# Patient Record
Sex: FEMALE | Race: WHITE | Employment: STUDENT | URBAN - NONMETROPOLITAN AREA
[De-identification: names, ages, dates, MRNs, and addresses within clinical notes are randomized per-mention and may not be internally consistent; named-entity substitution may affect disease eponyms.]

---
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
## 2023-01-27 ENCOUNTER — TELEPHONE (OUTPATIENT)
Dept: FAMILY MEDICINE CLINIC | Age: 18
End: 2023-01-27

## 2023-01-27 ENCOUNTER — TELEMEDICINE (OUTPATIENT)
Dept: FAMILY MEDICINE CLINIC | Age: 18
End: 2023-01-27
Payer: COMMERCIAL

## 2023-01-27 DIAGNOSIS — F41.1 GAD (GENERALIZED ANXIETY DISORDER): ICD-10-CM

## 2023-01-27 DIAGNOSIS — F51.04 PSYCHOPHYSIOLOGICAL INSOMNIA: Primary | ICD-10-CM

## 2023-01-27 PROCEDURE — 99214 OFFICE O/P EST MOD 30 MIN: CPT | Performed by: NURSE PRACTITIONER

## 2023-01-27 RX ORDER — PAROXETINE HYDROCHLORIDE 20 MG/1
20 TABLET, FILM COATED ORAL DAILY
Qty: 30 TABLET | Refills: 3 | Status: SHIPPED | OUTPATIENT
Start: 2023-01-27

## 2023-01-27 RX ORDER — HYDROXYZINE PAMOATE 25 MG/1
25 CAPSULE ORAL 3 TIMES DAILY PRN
Qty: 60 CAPSULE | Refills: 2 | Status: SHIPPED | OUTPATIENT
Start: 2023-01-27 | End: 2023-02-26

## 2023-01-27 ASSESSMENT — ENCOUNTER SYMPTOMS: RESPIRATORY NEGATIVE: 1

## 2023-01-27 NOTE — PROGRESS NOTES
2023    TELEHEALTH EVALUATION -- Audio/Visual (During MRUCA-32 public health emergency)    HPI:    Viral Ruggiero (:  2005) has requested an audio/video evaluation for the following concern(s): Anxiety     HPI:    Inciting events or triggers for anxiety - daily life events, has had anxiety for a few years never treated recently started on paxil 10 mg does not think it ha helped much. Is back at school, not noticing a difference and also states it decreases her appetite   Frequency of anxiety - everyday  Panic attacks? No  Symptoms of panic attacks -   n/a  Sleep Disturbances? Yes - trouble falling an staying asleep has tried melatonin but not working. Impaired concentration? No  Substance abuse? No  Suicidal/Homicidal Ideation? No    Compliant with meds: yes  Med side effects: Yes - will change to taking at night before bed     Sees therapist?: she did try to reach out to school counselor but did not schedule an appt. Advised to schedule appt with school counseling services. Family History of Mental Illness? unknown     Review of Systems   Constitutional:  Negative for chills, fatigue and fever. HENT: Negative. Respiratory: Negative. Skin: Negative. Psychiatric/Behavioral:  Positive for sleep disturbance. Negative for agitation, behavioral problems, confusion, decreased concentration, dysphoric mood, self-injury and suicidal ideas. The patient is nervous/anxious. Also felt a little down the other day very vague about what., states things build up sometimes and she does not know how to handle it     Prior to Visit Medications    Medication Sig Taking?  Authorizing Provider   PARoxetine (PAXIL) 20 MG tablet Take 1 tablet by mouth daily Yes Allison Art, APRN - CNP   hydrOXYzine pamoate (VISTARIL) 25 MG capsule Take 1 capsule by mouth 3 times daily as needed for Itching Yes Allison Art, APRN - CNP   montelukast (SINGULAIR) 10 MG tablet   Historical Provider, MD Levocetirizine Dihydrochloride (XYZAL PO) Take by mouth  Historical Provider, MD   fluticasone (FLONASE) 50 MCG/ACT nasal spray 1 spray by Each Nostril route daily  Patient not taking: Reported on 12/28/2022  Corazon Bread, APRN - CNP   loratadine (CLARITIN) 10 MG tablet Take 1 tablet by mouth daily  Patient not taking: Reported on 12/28/2022  Corazon Bread, APRN - CNP   ibuprofen (ADVIL;MOTRIN) 400 MG tablet Take 1 tablet by mouth every 8 hours as needed for Pain  Patient not taking: Reported on 12/28/2022  MICHELLE Copeland       Social History     Tobacco Use    Smoking status: Never     Passive exposure: Never    Smokeless tobacco: Never   Vaping Use    Vaping Use: Never used   Substance Use Topics    Alcohol use: No    Drug use: No        No Known Allergies, No past medical history on file., No past surgical history on file.,   Social History     Tobacco Use    Smoking status: Never     Passive exposure: Never    Smokeless tobacco: Never   Vaping Use    Vaping Use: Never used   Substance Use Topics    Alcohol use: No    Drug use: No   , No family history on file.,   Immunization History   Administered Date(s) Administered    COVID-19, PFIZER PURPLE top, DILUTE for use, (age 15 y+), 30mcg/0.3mL 09/06/2021, 10/02/2021    DTaP vaccine 2003, 2003, 02/27/2004, 02/21/2005    DTaP/IPV (Kristi Mom, Kinrix) 09/05/2008    HPV 9-valent Marylen Harden) 12/27/2019, 08/11/2021    HPV Quadrivalent (Gardasil) 06/17/2020    Hepatitis A Ped/Adol (Havrix, Vaqta) 09/05/2008, 12/22/2009    Hepatitis B Ped/Adol (Engerix-B, Recombivax HB) 2003, 02/27/2004, 08/11/2015    Hib (HbOC) 2003, 2003, 02/27/2004    Hib vaccine 02/21/2005    Influenza Virus Vaccine 11/07/2008, 12/22/2009, 08/23/2010, 10/22/2010, 01/20/2014    Influenza, FLUARIX, FLULAVAL, FLUZONE (age 10 mo+) AND AFLURIA, (age 1 y+), PF, 0.5mL 11/07/2018, 12/27/2019    MMR 11/02/2004, 09/05/2008    Meningococcal B, OMV (Bexsero) 08/11/2021, 03/02/2022    Meningococcal MCV4O (Menveo) 12/27/2019    Meningococcal MCV4P (Menactra) 08/02/2016    Polio IPV (IPOL) 2003, 2003    Polio Virus Vaccine 02/21/2005    Tdap (Boostrix, Adacel) 08/11/2015    Varicella (Varivax) 12/14/2004, 09/05/2008   ,   Health Maintenance   Topic Date Due    HIV screen  Never done    Chlamydia/GC screen  Never done    Hepatitis C screen  Never done    COVID-19 Vaccine (3 - Booster for Pfizer series) 11/27/2021    Flu vaccine (1) 08/01/2022    Depression Screen  12/28/2023    DTaP/Tdap/Td vaccine (7 - Td or Tdap) 08/11/2025    Hepatitis A vaccine  Completed    Hib vaccine  Completed    HPV vaccine  Completed    Varicella vaccine  Completed    Meningococcal (ACWY) vaccine  Completed    Pneumococcal 0-64 years Vaccine  Aged Out       PHYSICAL EXAMINATION:  [ INSTRUCTIONS:  \"[x]\" Indicates a positive item  \"[]\" Indicates a negative item  -- DELETE ALL ITEMS NOT EXAMINED]  Vital Signs: (As obtained by patient/caregiver or practitioner observation)    Blood pressure-  Heart rate-    Respiratory rate-    Temperature-  Pulse oximetry-     Constitutional: [x] Appears well-developed and well-nourished [x] No apparent distress      [] Abnormal-   Mental status  [x] Alert and awake  [x] Oriented to person/place/time [x]Able to follow commands      Eyes:  EOM    []  Normal  [] Abnormal-  Sclera  []  Normal  [] Abnormal -         Discharge []  None visible  [] Abnormal -    HENT:   [] Normocephalic, atraumatic.   [] Abnormal   [] Mouth/Throat: Mucous membranes are moist.     External Ears [] Normal  [] Abnormal-     Neck: [] No visualized mass     Pulmonary/Chest: [x] Respiratory effort normal.  [x] No visualized signs of difficulty breathing or respiratory distress        [] Abnormal-      Musculoskeletal:   [] Normal gait with no signs of ataxia         [] Normal range of motion of neck        [] Abnormal-       Neurological:        [] No Facial Asymmetry (Cranial nerve 7 motor function) (limited exam to video visit)          [] No gaze palsy        [] Abnormal-         Skin:        [x] No significant exanthematous lesions or discoloration noted on facial skin         [] Abnormal-            Psychiatric:       [] Normal Affect [] No Hallucinations        [] Abnormal-     Other pertinent observable physical exam findings-     ASSESSMENT/PLAN:  1. ANGIE (generalized anxiety disorder)  Not controlled  Increase paxil to 20 mg daily   - PARoxetine (PAXIL) 20 MG tablet; Take 1 tablet by mouth daily  Dispense: 30 tablet; Refill: 3    2. Psychophysiological insomnia  Uncontrolled add vistaril for insomnia may also supplement during the day if needed for anxiety  Contact school counselor for therapy   - hydrOXYzine pamoate (VISTARIL) 25 MG capsule; Take 1 capsule by mouth 3 times daily as needed for Itching  Dispense: 60 capsule; Refill: 2  Pt in agreement with plan   Return in about 3 weeks (around 2/17/2023) for VV for anxiety. Loreta Mcbride, was evaluated through a synchronous (real-time) audio-video encounter. The patient (or guardian if applicable) is aware that this is a billable service, which includes applicable co-pays. This Virtual Visit was conducted with patient's (and/or legal guardian's) consent. The visit was conducted pursuant to the emergency declaration under the [address] [place] [address], [address] [place] [address] authority and the [employer] and [employer] General Act. Patient identification was verified, and a caregiver was present when appropriate. The patient was located at [address]): [address] [place] [address].  [facility], [place]. Provider was located at [place] [address] ([address] [place] [address]): [address] [place] [address].  [facility], [place]. Total time spent on this encounter: Not billed by time    --MISTY Moreno - CNP on 1/27/2023 at 10:20 AM    An electronic signature was used to authenticate this note.

## 2023-01-30 NOTE — TELEPHONE ENCOUNTER
Future Appointments   Date Time Provider Jessica Mary   2/17/2023 10:00 AM [facility]

## 2023-02-17 ENCOUNTER — TELEPHONE (OUTPATIENT)
Dept: FAMILY MEDICINE CLINIC | Age: 18
End: 2023-02-17

## 2023-02-17 ENCOUNTER — TELEMEDICINE (OUTPATIENT)
Dept: FAMILY MEDICINE CLINIC | Age: 18
End: 2023-02-17
Payer: COMMERCIAL

## 2023-02-17 DIAGNOSIS — F51.04 PSYCHOPHYSIOLOGICAL INSOMNIA: ICD-10-CM

## 2023-02-17 DIAGNOSIS — F41.1 GAD (GENERALIZED ANXIETY DISORDER): Primary | ICD-10-CM

## 2023-02-17 PROCEDURE — 99213 OFFICE O/P EST LOW 20 MIN: CPT | Performed by: NURSE PRACTITIONER

## 2023-02-17 RX ORDER — TRAZODONE HYDROCHLORIDE 50 MG/1
50 TABLET ORAL NIGHTLY PRN
Qty: 30 TABLET | Refills: 5 | Status: SHIPPED | OUTPATIENT
Start: 2023-02-17

## 2023-02-17 SDOH — ECONOMIC STABILITY: HOUSING INSECURITY
IN THE LAST 12 MONTHS, WAS THERE A TIME WHEN YOU DID NOT HAVE A STEADY PLACE TO SLEEP OR SLEPT IN A SHELTER (INCLUDING NOW)?: PATIENT REFUSED

## 2023-02-17 SDOH — ECONOMIC STABILITY: TRANSPORTATION INSECURITY
IN THE PAST 12 MONTHS, HAS LACK OF TRANSPORTATION KEPT YOU FROM MEETINGS, WORK, OR FROM GETTING THINGS NEEDED FOR DAILY LIVING?: PATIENT DECLINED

## 2023-02-17 SDOH — ECONOMIC STABILITY: FOOD INSECURITY: WITHIN THE PAST 12 MONTHS, YOU WORRIED THAT YOUR FOOD WOULD RUN OUT BEFORE YOU GOT MONEY TO BUY MORE.: PATIENT DECLINED

## 2023-02-17 SDOH — ECONOMIC STABILITY: INCOME INSECURITY: HOW HARD IS IT FOR YOU TO PAY FOR THE VERY BASICS LIKE FOOD, HOUSING, MEDICAL CARE, AND HEATING?: PATIENT DECLINED

## 2023-02-17 SDOH — ECONOMIC STABILITY: FOOD INSECURITY: WITHIN THE PAST 12 MONTHS, THE FOOD YOU BOUGHT JUST DIDN'T LAST AND YOU DIDN'T HAVE MONEY TO GET MORE.: PATIENT DECLINED

## 2023-02-17 ASSESSMENT — ENCOUNTER SYMPTOMS: RESPIRATORY NEGATIVE: 1

## 2023-02-17 NOTE — PROGRESS NOTES
2023    TELEHEALTH EVALUATION -- Audio/Visual (During [facility])    HPI:    Arianna Wilkins (:  2005) has requested an audio/video evaluation for the following concern(s): Anxiety     HPI:    Inciting events or triggers for anxiety - daily life events, going out in public   Frequency of anxiety - it had been daily but better with the paxil, stats she is worrying less able to go more places without feeling anxious, is feeling happier. Panic attacks? No  Symptoms of panic attacks -   n/a  Sleep Disturbances? Has tried melatonin and vistaril 25 mg nightly but can not fall or stay asleep sleeping 4-5 hours a night, her mind races and can;t shut it down. Impaired concentration? No  Substance abuse? No  Suicidal/Homicidal Ideation? No    Compliant with meds: yes  Med side effects: No    Sees therapist?: No  Family History of Mental Illness? No     Review of Systems   Constitutional:  Negative for fatigue and fever. Respiratory: Negative. Skin: Negative. Neurological: Negative. Psychiatric/Behavioral:  Positive for sleep disturbance. Negative for agitation, behavioral problems, decreased concentration, dysphoric mood, self-injury and suicidal ideas. The patient is nervous/anxious. Prior to Visit Medications    Medication Sig Taking?  Authorizing Provider   traZODone (DESYREL) 50 MG tablet Take 1 tablet by mouth nightly as needed for Sleep Yes Jj Valdez APRN - CNP   PARoxetine (PAXIL) 20 MG tablet Take 1 tablet by mouth daily  Nirmal Jennings APRN - CNP   hydrOXYzine pamoate (VISTARIL) 25 MG capsule Take 1 capsule by mouth 3 times daily as needed for Itching  Jj Valdez APRN - CNP   montelukast (SINGULAIR) 10 MG tablet   Historical Provider, MD   Levocetirizine Dihydrochloride (XYZAL PO) Take by mouth  Historical Provider, MD   fluticasone (FLONASE) 50 MCG/ACT nasal spray 1 spray by Each Nostril route daily  Patient not taking: Reported on 2022  Marlene Wadsworth MISTY Mosher - CNP   loratadine (CLARITIN) 10 MG tablet Take 1 tablet by mouth daily  Patient not taking: Reported on 12/28/2022  MISTY Calderon CNP   ibuprofen (ADVIL;MOTRIN) 400 MG tablet Take 1 tablet by mouth every 8 hours as needed for Pain  Patient not taking: Reported on 12/28/2022  MICHELLE Mock       Social History     Tobacco Use    Smoking status: Never     Passive exposure: Never    Smokeless tobacco: Never   Vaping Use    Vaping Use: Never used   Substance Use Topics    Alcohol use: No    Drug use: No        No Known Allergies, History reviewed. No pertinent past medical history. , History reviewed. No pertinent surgical history. ,   Social History     Tobacco Use    Smoking status: Never     Passive exposure: Never    Smokeless tobacco: Never   Vaping Use    Vaping Use: Never used   Substance Use Topics    Alcohol use: No    Drug use: No   , History reviewed. No pertinent family history. ,   Immunization History   Administered Date(s) Administered    COVID-19, PFIZER PURPLE top, DILUTE for use, (age 15 y+), 30mcg/0.3mL 09/06/2021, 10/02/2021    DTaP vaccine 2003, 2003, 02/27/2004, 02/21/2005    DTaP/IPV (Loly Mike, Kinrix) 09/05/2008    HPV 9-valent Yasmany Santosh) 12/27/2019, 08/11/2021    HPV Quadrivalent (Gardasil) 06/17/2020    Hepatitis A Ped/Adol (Havrix, Vaqta) 09/05/2008, 12/22/2009    Hepatitis B Ped/Adol (Engerix-B, Recombivax HB) 2003, 02/27/2004, 08/11/2015    Hib (HbOC) 2003, 2003, 02/27/2004    Hib vaccine 02/21/2005    Influenza Virus Vaccine 11/07/2008, 12/22/2009, 08/23/2010, 10/22/2010, 01/20/2014    Influenza, FLUARIX, FLULAVAL, FLUZONE (age 10 mo+) AND AFLURIA, (age 1 y+), PF, 0.5mL 11/07/2018, 12/27/2019    MMR 11/02/2004, 09/05/2008    Meningococcal B, OMV (Bexsero) 08/11/2021, 03/02/2022    Meningococcal MCV4O (Menveo) 12/27/2019    Meningococcal MCV4P (Menactra) 08/02/2016    Polio IPV (IPOL) 2003, 2003    Polio Virus Vaccine 02/21/2005    Tdap (Boostrix, Adacel) 08/11/2015    Varicella (Varivax) 12/14/2004, 09/05/2008   ,   Health Maintenance   Topic Date Due    HIV screen  Never done    Chlamydia/GC screen  Never done    Hepatitis C screen  Never done    COVID-19 Vaccine (3 - Booster for Pfizer series) 11/27/2021    Flu vaccine (1) 08/01/2022    Depression Screen  12/28/2023    DTaP/Tdap/Td vaccine (7 - Td or Tdap) 08/11/2025    Hepatitis A vaccine  Completed    Hib vaccine  Completed    HPV vaccine  Completed    Varicella vaccine  Completed    Meningococcal (ACWY) vaccine  Completed    Pneumococcal 0-64 years Vaccine  Aged Out       PHYSICAL EXAMINATION:  [ INSTRUCTIONS:  \"[x]\" Indicates a positive item  \"[]\" Indicates a negative item  -- DELETE ALL ITEMS NOT EXAMINED]  Vital Signs: (As obtained by patient/caregiver or practitioner observation)    Blood pressure-  Heart rate-    Respiratory rate-    Temperature-  Pulse oximetry-     Constitutional: [x] Appears well-developed and well-nourished [x] No apparent distress      [] Abnormal-   Mental status  [x] Alert and awake  [x] Oriented to person/place/time [x]Able to follow commands      Eyes:  EOM    []  Normal  [] Abnormal-  Sclera  []  Normal  [] Abnormal -         Discharge []  None visible  [] Abnormal -    HENT:   [x] Normocephalic, atraumatic.   [] Abnormal   [] Mouth/Throat: Mucous membranes are moist.     External Ears [] Normal  [] Abnormal-     Neck: [] No visualized mass     Pulmonary/Chest: [x] Respiratory effort normal.  [x] No visualized signs of difficulty breathing or respiratory distress        [] Abnormal-      Musculoskeletal:   [] Normal gait with no signs of ataxia         [] Normal range of motion of neck        [] Abnormal-       Neurological:        [] No Facial Asymmetry (Cranial nerve 7 motor function) (limited exam to video visit)          [] No gaze palsy        [] Abnormal-         Skin:        [x] No significant exanthematous lesions or discoloration noted on facial skin         [] Abnormal-            Psychiatric:       [x] Normal Affect [] No Hallucinations        [] Abnormal-     Other pertinent observable physical exam findings-     ASSESSMENT/PLAN:  1. ANGIE (generalized anxiety disorder)  Improved  Continue with 20 mg daily  Pt in agreement with plan    2. Psychophysiological insomnia  Start trazodone  Advise counseling through school    Return in about 3 months (around 5/17/2023) for f/u anxiety. Tarah Patel, was evaluated through a synchronous (real-time) audio-video encounter. The patient (or guardian if applicable) is aware that this is a billable service, which includes applicable co-pays. This Virtual Visit was conducted with patient's (and/or legal guardian's) consent. The visit was conducted pursuant to the emergency declaration under the [address] [place] [address], [address] [place] [address] [facility] authority and the [email or web address] and [employer] General Act. Patient identification was verified, and a caregiver was present when appropriate. The patient was located at Home: [address] [place] [id number]  Provider was located at [facility] ([address] [place] [address] Dept): [facility].  [facility], [place]        Total time spent on this encounter: Not billed by time    --MISTY Limon CNP on 2/17/2023 at 10:13 AM    An electronic signature was used to authenticate this note.

## 2023-03-29 ENCOUNTER — TELEMEDICINE (OUTPATIENT)
Dept: FAMILY MEDICINE CLINIC | Age: 18
End: 2023-03-29
Payer: COMMERCIAL

## 2023-03-29 DIAGNOSIS — J30.89 NON-SEASONAL ALLERGIC RHINITIS DUE TO OTHER ALLERGIC TRIGGER: ICD-10-CM

## 2023-03-29 DIAGNOSIS — F41.1 GAD (GENERALIZED ANXIETY DISORDER): Primary | ICD-10-CM

## 2023-03-29 DIAGNOSIS — F32.9 REACTIVE DEPRESSION: ICD-10-CM

## 2023-03-29 DIAGNOSIS — R45.86 MOOD SWING: ICD-10-CM

## 2023-03-29 DIAGNOSIS — R45.4 IRRITABILITY: ICD-10-CM

## 2023-03-29 DIAGNOSIS — F41.0 PANIC ATTACK: ICD-10-CM

## 2023-03-29 PROCEDURE — 99214 OFFICE O/P EST MOD 30 MIN: CPT | Performed by: NURSE PRACTITIONER

## 2023-03-29 RX ORDER — PAROXETINE HYDROCHLORIDE 20 MG/1
20 TABLET, FILM COATED ORAL DAILY
Qty: 30 TABLET | Refills: 3 | Status: SHIPPED | OUTPATIENT
Start: 2023-03-29

## 2023-03-29 RX ORDER — QUETIAPINE FUMARATE 50 MG/1
50 TABLET, EXTENDED RELEASE ORAL NIGHTLY
Qty: 30 TABLET | Refills: 4 | Status: SHIPPED | OUTPATIENT
Start: 2023-03-29

## 2023-03-29 RX ORDER — HYDROXYZINE HYDROCHLORIDE 25 MG/1
25 TABLET, FILM COATED ORAL EVERY 8 HOURS PRN
Qty: 30 TABLET | Refills: 0 | Status: SHIPPED | OUTPATIENT
Start: 2023-03-29 | End: 2023-04-08

## 2023-03-29 ASSESSMENT — ENCOUNTER SYMPTOMS: RESPIRATORY NEGATIVE: 1

## 2023-03-29 NOTE — PROGRESS NOTES
tablet; Refill: 3    2. Mood swing  -#1  - QUEtiapine (SEROQUEL XR) 50 MG extended release tablet; Take 1 tablet by mouth nightly  Dispense: 30 tablet; Refill: 4    3. Irritability  -#1  - QUEtiapine (SEROQUEL XR) 50 MG extended release tablet; Take 1 tablet by mouth nightly  Dispense: 30 tablet; Refill: 4    4. Panic attack  -#1  -will also order atarax for short term anxiety  - QUEtiapine (SEROQUEL XR) 50 MG extended release tablet; Take 1 tablet by mouth nightly  Dispense: 30 tablet; Refill: 4    5. Reactive depression  -uncontrolled, continue with paxil 20 mg daily and add seroquel 50 mg hs  - QUEtiapine (SEROQUEL XR) 50 MG extended release tablet; Take 1 tablet by mouth nightly  Dispense: 30 tablet; Refill: 4    6. Non-seasonal allergic rhinitis due to other allergic trigger  -improve  Continue singulair 10 mg and xyzal 5 mg daily  Pt in agreement with plan      Return if symptoms worsen or fail to improve. Pt has jaguar t scheduled for May 2023. Abdulaziz Hepmhill, was evaluated through a synchronous (real-time) audio-video encounter. The patient (or guardian if applicable) is aware that this is a billable service, which includes applicable co-pays. This Virtual Visit was conducted with patient's (and/or legal guardian's) consent. The visit was conducted pursuant to the emergency declaration under the [address] [place] [address] and the Arnaldo docplanner and [employer] General Act. Patient identification was verified, and a caregiver was present when appropriate. The patient was located at Home: [id number] [place] [address] [address] [address]  Provider was located at [facility] [address].  Galindo CHANG AM, II.VIERTEL        Total time spent on this encounter: Not billed by time    --MISTY Dave CNP on 3/29/2023 at 1:26 PM    An electronic signature was used to authenticate this note.

## 2023-05-30 RX ORDER — HYDROXYZINE HYDROCHLORIDE 25 MG/1
25 TABLET, FILM COATED ORAL EVERY 8 HOURS PRN
Qty: 30 TABLET | Refills: 0 | Status: SHIPPED | OUTPATIENT
Start: 2023-05-30 | End: 2023-06-09

## 2023-06-26 RX ORDER — HYDROXYZINE HYDROCHLORIDE 25 MG/1
25 TABLET, FILM COATED ORAL EVERY 8 HOURS PRN
Qty: 30 TABLET | Refills: 0 | Status: SHIPPED | OUTPATIENT
Start: 2023-06-26 | End: 2023-07-06

## 2023-06-30 ENCOUNTER — OFFICE VISIT (OUTPATIENT)
Dept: FAMILY MEDICINE CLINIC | Age: 18
End: 2023-06-30
Payer: COMMERCIAL

## 2023-06-30 VITALS
TEMPERATURE: 97.8 F | WEIGHT: 130.4 LBS | RESPIRATION RATE: 16 BRPM | DIASTOLIC BLOOD PRESSURE: 80 MMHG | HEIGHT: 67 IN | BODY MASS INDEX: 20.47 KG/M2 | SYSTOLIC BLOOD PRESSURE: 114 MMHG | OXYGEN SATURATION: 99 % | HEART RATE: 86 BPM

## 2023-06-30 DIAGNOSIS — H92.02 ACUTE OTALGIA, LEFT: Primary | ICD-10-CM

## 2023-06-30 PROCEDURE — 99214 OFFICE O/P EST MOD 30 MIN: CPT | Performed by: NURSE PRACTITIONER

## 2023-06-30 RX ORDER — AMOXICILLIN AND CLAVULANATE POTASSIUM 875; 125 MG/1; MG/1
1 TABLET, FILM COATED ORAL 2 TIMES DAILY
Qty: 14 TABLET | Refills: 0 | Status: SHIPPED | OUTPATIENT
Start: 2023-06-30 | End: 2023-07-07

## 2023-06-30 RX ORDER — PREDNISONE 20 MG/1
20 TABLET ORAL DAILY
Qty: 5 TABLET | Refills: 0 | Status: SHIPPED | OUTPATIENT
Start: 2023-06-30 | End: 2023-07-05

## 2023-06-30 ASSESSMENT — PATIENT HEALTH QUESTIONNAIRE - PHQ9
SUM OF ALL RESPONSES TO PHQ QUESTIONS 1-9: 0
2. FEELING DOWN, DEPRESSED OR HOPELESS: 0
7. TROUBLE CONCENTRATING ON THINGS, SUCH AS READING THE NEWSPAPER OR WATCHING TELEVISION: 0
6. FEELING BAD ABOUT YOURSELF - OR THAT YOU ARE A FAILURE OR HAVE LET YOURSELF OR YOUR FAMILY DOWN: 0
SUM OF ALL RESPONSES TO PHQ QUESTIONS 1-9: 0
3. TROUBLE FALLING OR STAYING ASLEEP: 0
SUM OF ALL RESPONSES TO PHQ9 QUESTIONS 1 & 2: 0
4. FEELING TIRED OR HAVING LITTLE ENERGY: 0
8. MOVING OR SPEAKING SO SLOWLY THAT OTHER PEOPLE COULD HAVE NOTICED. OR THE OPPOSITE, BEING SO FIGETY OR RESTLESS THAT YOU HAVE BEEN MOVING AROUND A LOT MORE THAN USUAL: 0
SUM OF ALL RESPONSES TO PHQ QUESTIONS 1-9: 0
10. IF YOU CHECKED OFF ANY PROBLEMS, HOW DIFFICULT HAVE THESE PROBLEMS MADE IT FOR YOU TO DO YOUR WORK, TAKE CARE OF THINGS AT HOME, OR GET ALONG WITH OTHER PEOPLE: 0
1. LITTLE INTEREST OR PLEASURE IN DOING THINGS: 0
SUM OF ALL RESPONSES TO PHQ QUESTIONS 1-9: 0
5. POOR APPETITE OR OVEREATING: 0
9. THOUGHTS THAT YOU WOULD BE BETTER OFF DEAD, OR OF HURTING YOURSELF: 0

## 2023-07-31 RX ORDER — HYDROXYZINE HYDROCHLORIDE 25 MG/1
25 TABLET, FILM COATED ORAL EVERY 8 HOURS PRN
Qty: 30 TABLET | Refills: 0 | Status: SHIPPED | OUTPATIENT
Start: 2023-07-31 | End: 2023-08-10

## 2023-07-31 NOTE — TELEPHONE ENCOUNTER
Recent Visits  Date Type Provider Dept   06/30/23 Office Visit MISTY Webster - CNP [facility]   12/28/22 Office Visit MISTY Adams - CNP [facility]   Showing recent visits within past 540 days with a meds authorizing provider and meeting all other requirements  Future Appointments  No visits were found meeting these conditions.   Showing future appointments within next 150 days with a meds authorizing provider and meeting all other requirements

## 2023-09-05 RX ORDER — HYDROXYZINE HYDROCHLORIDE 25 MG/1
25 TABLET, FILM COATED ORAL EVERY 8 HOURS PRN
Qty: 30 TABLET | Refills: 0 | Status: SHIPPED | OUTPATIENT
Start: 2023-09-05 | End: 2023-09-15

## 2023-09-05 NOTE — TELEPHONE ENCOUNTER
Recent Visits  Date Type Provider Dept   06/30/23 Office Visit MISTY Salomon - CNP [facility]   12/28/22 Office Visit MISTY Chinchilla - CNP [facility]   Showing recent visits within past 540 days with a meds authorizing provider and meeting all other requirements  Future Appointments  No visits were found meeting these conditions.   Showing future appointments within next 150 days with a meds authorizing provider and meeting all other requirements

## 2023-10-09 ENCOUNTER — OFFICE VISIT (OUTPATIENT)
Dept: FAMILY MEDICINE CLINIC | Age: 18
End: 2023-10-09
Payer: COMMERCIAL

## 2023-10-09 VITALS
RESPIRATION RATE: 16 BRPM | DIASTOLIC BLOOD PRESSURE: 76 MMHG | WEIGHT: 159.8 LBS | TEMPERATURE: 98.5 F | HEIGHT: 67 IN | HEART RATE: 81 BPM | BODY MASS INDEX: 25.08 KG/M2 | SYSTOLIC BLOOD PRESSURE: 116 MMHG | OXYGEN SATURATION: 99 %

## 2023-10-09 DIAGNOSIS — F41.1 GAD (GENERALIZED ANXIETY DISORDER): ICD-10-CM

## 2023-10-09 DIAGNOSIS — L70.0 CYSTIC ACNE: Primary | ICD-10-CM

## 2023-10-09 DIAGNOSIS — F33.0 MAJOR DEPRESSIVE DISORDER, RECURRENT, MILD (HCC): ICD-10-CM

## 2023-10-09 PROCEDURE — 99213 OFFICE O/P EST LOW 20 MIN: CPT | Performed by: NURSE PRACTITIONER

## 2023-10-09 RX ORDER — NORGESTIMATE AND ETHINYL ESTRADIOL 0.25-0.035
KIT ORAL
COMMUNITY
Start: 2023-10-06

## 2023-10-09 RX ORDER — CLINDAMYCIN AND BENZOYL PEROXIDE 10; 50 MG/G; MG/G
GEL TOPICAL
Qty: 50 G | Refills: 3 | Status: SHIPPED | OUTPATIENT
Start: 2023-10-09

## 2023-10-09 RX ORDER — PAROXETINE HYDROCHLORIDE 20 MG/1
20 TABLET, FILM COATED ORAL DAILY
Qty: 90 TABLET | Refills: 3 | Status: SHIPPED | OUTPATIENT
Start: 2023-10-09

## 2023-10-09 RX ORDER — TRETINOIN 0.5 MG/G
CREAM TOPICAL
Qty: 45 G | Refills: 3 | Status: SHIPPED | OUTPATIENT
Start: 2023-10-09 | End: 2023-11-08

## 2023-10-09 RX ORDER — ADAPALENE 0.1 G/100G
CREAM TOPICAL
Qty: 45 G | Refills: 4 | Status: SHIPPED | OUTPATIENT
Start: 2023-10-09 | End: 2023-11-08

## 2023-10-16 RX ORDER — HYDROXYZINE HYDROCHLORIDE 25 MG/1
25 TABLET, FILM COATED ORAL EVERY 8 HOURS PRN
Qty: 30 TABLET | Refills: 0 | Status: SHIPPED | OUTPATIENT
Start: 2023-10-16 | End: 2023-10-26

## 2023-10-16 NOTE — TELEPHONE ENCOUNTER
Recent Visits  Date Type Provider Dept   10/09/23 Office Visit Moustapha Valero, MISTY Song CNP [facility]   06/30/23 Office Visit Yennifer Adhikari, MISTY Song CNP [facility]   12/28/22 Office Visit Nirmal Pantoja, APRN - CNP [facility]   Showing recent visits within past 540 days with a meds authorizing provider and meeting all other requirements  Future Appointments  Date Type Provider Dept   01/04/24 Appointment Nirmal Pantoja, APRN - CNP [facility]   Showing future appointments within next 150 days with a meds authorizing provider and meeting all other requirements

## 2023-11-01 RX ORDER — HYDROXYZINE HYDROCHLORIDE 25 MG/1
25 TABLET, FILM COATED ORAL EVERY 8 HOURS PRN
Qty: 30 TABLET | Refills: 0 | Status: SHIPPED | OUTPATIENT
Start: 2023-11-01 | End: 2023-11-11

## 2023-11-01 NOTE — TELEPHONE ENCOUNTER
Recent Visits  Date Type Provider Dept   10/09/23 Office Visit Ute Bautista, MISTY - CNP [facility]   06/30/23 Office Visit Jeni Winters, MISTY - CNP [facility]   12/28/22 Office Visit Nirmal See APRN - CNP [facility]   Showing recent visits within past 540 days with a meds authorizing provider and meeting all other requirements  Future Appointments  Date Type Provider Dept   01/04/24 Appointment Nirmal See, MISTY - CNP [facility]   Showing future appointments within next 150 days with a meds authorizing provider and meeting all other requirements

## 2024-03-06 ENCOUNTER — TELEPHONE (OUTPATIENT)
Dept: FAMILY MEDICINE CLINIC | Age: 19
End: 2024-03-06

## 2024-03-06 DIAGNOSIS — F41.9 ANXIETY: Primary | ICD-10-CM

## 2024-03-06 NOTE — TELEPHONE ENCOUNTER
----- Message from Rachna Orozco sent at 3/6/2024  1:31 PM EST -----  Subject: Message to Provider    QUESTIONS  Information for Provider? Spoke with patient, she states that she is   needing lab orders placed in her chart. Her previous orders have already    and she is needing those to be updated. Please return her call to   assist, thank you.  ---------------------------------------------------------------------------  --------------  CALL BACK INFO  [phone]; OK to leave message on voicemail  ---------------------------------------------------------------------------  --------------  SCRIPT ANSWERS  Relationship to Patient? Self

## 2024-03-07 ENCOUNTER — NURSE ONLY (OUTPATIENT)
Dept: LAB | Age: 19
End: 2024-03-07

## 2024-03-07 ENCOUNTER — TELEPHONE (OUTPATIENT)
Dept: FAMILY MEDICINE CLINIC | Age: 19
End: 2024-03-07

## 2024-03-07 DIAGNOSIS — F41.9 ANXIETY: ICD-10-CM

## 2024-03-07 LAB
BASOPHILS NFR BLD AUTO: 0.7 %
BUN SERPL-MCNC: 11 MG/DL (ref 7–22)
CALCIUM SERPL-MCNC: 9.6 MG/DL (ref 8.5–10.5)
CHLORIDE SERPL-SCNC: 104 MEQ/L (ref 98–111)
CO2 SERPL-SCNC: 22 MEQ/L (ref 23–33)
CREAT SERPL-MCNC: 0.8 MG/DL (ref 0.4–1.2)
DEPRECATED RDW RBC AUTO: 44.5 FL (ref 35–45)
EOSINOPHIL NFR BLD AUTO: 2.5 %
EOSINOPHILS ABSOLUTE: 0.1 THOU/MM3 (ref 0–0.4)
ERYTHROCYTE [DISTWIDTH] IN BLOOD BY AUTOMATED COUNT: 13.1 % (ref 11.5–14.5)
GFR SERPL CREATININE-BSD FRML MDRD: > 60 ML/MIN/1.73M2
GLUCOSE SERPL-MCNC: 93 MG/DL (ref 70–108)
HCT VFR BLD AUTO: 42 % (ref 37–47)
HGB BLD-MCNC: 13.1 GM/DL (ref 12–16)
IMM GRANULOCYTES # BLD AUTO: 0.02 THOU/MM3 (ref 0–0.07)
IMM GRANULOCYTES NFR BLD AUTO: 0.4 %
LYMPHOCYTES ABSOLUTE: 2.1 THOU/MM3 (ref 1–4.8)
LYMPHOCYTES NFR BLD AUTO: 37.9 %
MCH RBC QN AUTO: 28.9 PG (ref 26–33)
MCHC RBC AUTO-ENTMCNC: 31.2 GM/DL (ref 32.2–35.5)
MONOCYTES ABSOLUTE: 0.6 THOU/MM3 (ref 0.4–1.3)
MONOCYTES NFR BLD AUTO: 10.5 %
NEUTROPHILS NFR BLD AUTO: 48 %
NRBC BLD AUTO-RTO: 0 /100 WBC
PLATELET # BLD AUTO: 302 THOU/MM3 (ref 130–400)
PMV BLD AUTO: 10.1 FL (ref 9.4–12.4)
POTASSIUM SERPL-SCNC: 4.4 MEQ/L (ref 3.5–5.2)
RBC # BLD AUTO: 4.53 MILL/MM3 (ref 4.2–5.4)
SEGMENTED NEUTROPHILS ABSOLUTE COUNT: 2.6 THOU/MM3 (ref 1.8–7.7)
SODIUM SERPL-SCNC: 141 MEQ/L (ref 135–145)
WBC # BLD AUTO: 5.5 THOU/MM3 (ref 4.8–10.8)

## 2024-03-07 NOTE — TELEPHONE ENCOUNTER
----- Message from MISTY Vail - CNP sent at 3/7/2024 12:24 PM EST -----  Let pt know labs look good, no anemia, TSH normal and glucose and renal function is normal.

## 2024-03-07 NOTE — TELEPHONE ENCOUNTER
Left message on answering machine requesting pt to call back at earliest convenience.   No HIPAA on file

## 2024-03-08 NOTE — TELEPHONE ENCOUNTER
Nirmal Jennings, APRN - CNP Nurse Practitioner Signed 3/6/2024     Copy     New lab orders palced, please remind her to fast before having done           Left message on answering machine requesting pt to call back at earliest convenience.   2 messages in this encounter

## 2024-05-13 RX ORDER — HYDROXYZINE HYDROCHLORIDE 25 MG/1
25 TABLET, FILM COATED ORAL EVERY 8 HOURS PRN
Qty: 30 TABLET | Refills: 0 | Status: SHIPPED | OUTPATIENT
Start: 2024-05-13 | End: 2024-05-23

## 2024-05-13 NOTE — TELEPHONE ENCOUNTER
Recent Visits  Date Type Provider Dept   10/09/23 Office Visit Nirmal Jennings APRN - CNP [facility]   06/30/23 Office Visit Radha Piña APRN - CNP [facility]   12/28/22 Office Visit Nirmal Jennings APRN - CNP [facility]   Showing recent visits within past 540 days with a meds authorizing provider and meeting all other requirements  Future Appointments  No visits were found meeting these conditions.  Showing future appointments within next 150 days with a meds authorizing provider and meeting all other requirements

## 2024-10-20 DIAGNOSIS — F41.1 GAD (GENERALIZED ANXIETY DISORDER): ICD-10-CM

## 2024-10-21 ENCOUNTER — TELEPHONE (OUTPATIENT)
Dept: FAMILY MEDICINE CLINIC | Age: 19
End: 2024-10-21

## 2024-10-21 RX ORDER — PAROXETINE 20 MG/1
20 TABLET, FILM COATED ORAL DAILY
Qty: 90 TABLET | Refills: 0 | Status: SHIPPED | OUTPATIENT
Start: 2024-10-21

## 2024-10-21 NOTE — TELEPHONE ENCOUNTER
Recent Visits  Date Type Provider Dept   10/09/23 Office Visit Nirmal Jennings APRN - CNP [facility]   06/30/23 Office Visit Radha Piña APRN - CNP [facility]   Showing recent visits within past 540 days with a meds authorizing provider and meeting all other requirements  Future Appointments  No visits were found meeting these conditions.  Showing future appointments within next 150 days with a meds authorizing provider and meeting all other requirements

## 2024-10-21 NOTE — TELEPHONE ENCOUNTER
Pt asking for med refills had not been seen since 2023, I did give her  90 day supply but pt is due for a visit for a Physical to make sure meds are working well for her, so  more refills can be given,

## 2024-10-22 NOTE — TELEPHONE ENCOUNTER
Left message on answering machine. Requested pt to call back at [phone], at their earliest convenience.

## 2024-10-23 NOTE — TELEPHONE ENCOUNTER
Left message on answering machine. Requested pt to call back at [phone], at their earliest convenience.     [employer] message sent with link to schedule appointment.

## 2024-10-30 ENCOUNTER — OFFICE VISIT (OUTPATIENT)
Dept: FAMILY MEDICINE CLINIC | Age: 19
End: 2024-10-30
Payer: COMMERCIAL

## 2024-10-30 VITALS
OXYGEN SATURATION: 98 % | TEMPERATURE: 98.3 F | BODY MASS INDEX: 26.81 KG/M2 | WEIGHT: 170.8 LBS | HEIGHT: 67 IN | SYSTOLIC BLOOD PRESSURE: 116 MMHG | HEART RATE: 100 BPM | RESPIRATION RATE: 16 BRPM | DIASTOLIC BLOOD PRESSURE: 78 MMHG

## 2024-10-30 DIAGNOSIS — F33.0 MAJOR DEPRESSIVE DISORDER, RECURRENT, MILD (HCC): ICD-10-CM

## 2024-10-30 DIAGNOSIS — Z13.31 POSITIVE SCREENING FOR DEPRESSION ON 9-ITEM PATIENT HEALTH QUESTIONNAIRE (PHQ-9): ICD-10-CM

## 2024-10-30 DIAGNOSIS — Z00.00 PHYSICAL EXAM: Primary | ICD-10-CM

## 2024-10-30 PROCEDURE — 99395 PREV VISIT EST AGE 18-39: CPT | Performed by: NURSE PRACTITIONER

## 2024-10-30 RX ORDER — FLUOXETINE 10 MG/1
10 CAPSULE ORAL DAILY
Qty: 30 CAPSULE | Refills: 3 | Status: SHIPPED | OUTPATIENT
Start: 2024-10-30

## 2024-10-30 SDOH — ECONOMIC STABILITY: FOOD INSECURITY: WITHIN THE PAST 12 MONTHS, THE FOOD YOU BOUGHT JUST DIDN'T LAST AND YOU DIDN'T HAVE MONEY TO GET MORE.: NEVER TRUE

## 2024-10-30 SDOH — ECONOMIC STABILITY: FOOD INSECURITY: WITHIN THE PAST 12 MONTHS, YOU WORRIED THAT YOUR FOOD WOULD RUN OUT BEFORE YOU GOT MONEY TO BUY MORE.: NEVER TRUE

## 2024-10-30 SDOH — ECONOMIC STABILITY: TRANSPORTATION INSECURITY
IN THE PAST 12 MONTHS, HAS LACK OF TRANSPORTATION KEPT YOU FROM MEETINGS, WORK, OR FROM GETTING THINGS NEEDED FOR DAILY LIVING?: YES

## 2024-10-30 SDOH — ECONOMIC STABILITY: INCOME INSECURITY: HOW HARD IS IT FOR YOU TO PAY FOR THE VERY BASICS LIKE FOOD, HOUSING, MEDICAL CARE, AND HEATING?: SOMEWHAT HARD

## 2024-10-30 ASSESSMENT — PATIENT HEALTH QUESTIONNAIRE - PHQ9
SUM OF ALL RESPONSES TO PHQ QUESTIONS 1-9: 12
5. POOR APPETITE OR OVEREATING: SEVERAL DAYS
2. FEELING DOWN, DEPRESSED OR HOPELESS: SEVERAL DAYS
8. MOVING OR SPEAKING SO SLOWLY THAT OTHER PEOPLE COULD HAVE NOTICED. OR THE OPPOSITE, BEING SO FIGETY OR RESTLESS THAT YOU HAVE BEEN MOVING AROUND A LOT MORE THAN USUAL: SEVERAL DAYS
1. LITTLE INTEREST OR PLEASURE IN DOING THINGS: SEVERAL DAYS
9. THOUGHTS THAT YOU WOULD BE BETTER OFF DEAD, OR OF HURTING YOURSELF: SEVERAL DAYS
SUM OF ALL RESPONSES TO PHQ QUESTIONS 1-9: 13
4. FEELING TIRED OR HAVING LITTLE ENERGY: MORE THAN HALF THE DAYS
1. LITTLE INTEREST OR PLEASURE IN DOING THINGS: SEVERAL DAYS
9. THOUGHTS THAT YOU WOULD BE BETTER OFF DEAD, OR OF HURTING YOURSELF: SEVERAL DAYS
7. TROUBLE CONCENTRATING ON THINGS, SUCH AS READING THE NEWSPAPER OR WATCHING TELEVISION: MORE THAN HALF THE DAYS
SUM OF ALL RESPONSES TO PHQ QUESTIONS 1-9: 13
SUM OF ALL RESPONSES TO PHQ QUESTIONS 1-9: 13
10. IF YOU CHECKED OFF ANY PROBLEMS, HOW DIFFICULT HAVE THESE PROBLEMS MADE IT FOR YOU TO DO YOUR WORK, TAKE CARE OF THINGS AT HOME, OR GET ALONG WITH OTHER PEOPLE: SOMEWHAT DIFFICULT
6. FEELING BAD ABOUT YOURSELF - OR THAT YOU ARE A FAILURE OR HAVE LET YOURSELF OR YOUR FAMILY DOWN: MORE THAN HALF THE DAYS
4. FEELING TIRED OR HAVING LITTLE ENERGY: MORE THAN HALF THE DAYS
3. TROUBLE FALLING OR STAYING ASLEEP: MORE THAN HALF THE DAYS
7. TROUBLE CONCENTRATING ON THINGS, SUCH AS READING THE NEWSPAPER OR WATCHING TELEVISION: MORE THAN HALF THE DAYS
10. IF YOU CHECKED OFF ANY PROBLEMS, HOW DIFFICULT HAVE THESE PROBLEMS MADE IT FOR YOU TO DO YOUR WORK, TAKE CARE OF THINGS AT HOME, OR GET ALONG WITH OTHER PEOPLE: SOMEWHAT DIFFICULT
8. MOVING OR SPEAKING SO SLOWLY THAT OTHER PEOPLE COULD HAVE NOTICED. OR THE OPPOSITE - BEING SO FIDGETY OR RESTLESS THAT YOU HAVE BEEN MOVING AROUND A LOT MORE THAN USUAL: SEVERAL DAYS
5. POOR APPETITE OR OVEREATING: SEVERAL DAYS
2. FEELING DOWN, DEPRESSED OR HOPELESS: SEVERAL DAYS
SUM OF ALL RESPONSES TO PHQ9 QUESTIONS 1 & 2: 2
6. FEELING BAD ABOUT YOURSELF - OR THAT YOU ARE A FAILURE OR HAVE LET YOURSELF OR YOUR FAMILY DOWN: MORE THAN HALF THE DAYS
3. TROUBLE FALLING OR STAYING ASLEEP: MORE THAN HALF THE DAYS
SUM OF ALL RESPONSES TO PHQ QUESTIONS 1-9: 13

## 2024-10-30 ASSESSMENT — ENCOUNTER SYMPTOMS
RESPIRATORY NEGATIVE: 1
GASTROINTESTINAL NEGATIVE: 1

## 2024-10-30 ASSESSMENT — COLUMBIA-SUICIDE SEVERITY RATING SCALE - C-SSRS
1. IN THE PAST MONTH, HAVE YOU WISHED YOU WERE DEAD OR WISHED YOU COULD GO TO SLEEP AND NOT WAKE UP?: YES
6. IN YOUR LIFETIME, HAVE YOU EVER DONE ANYTHING, STARTED TO DO ANYTHING, OR PREPARED TO DO ANYTHING TO END YOUR LIFE?: NO
2. IN THE PAST MONTH, HAVE YOU ACTUALLY HAD ANY THOUGHTS OF KILLING YOURSELF?: NO

## 2024-10-30 NOTE — PATIENT INSTRUCTIONS
years old $0.75, under 6 free.   Phone Number: 848.829.5745  Website: https://www.[employer] transit:  What they offer: Transportation throughout the state of [place] and [place]. Available for Ambulatory and wheelchair bound services.  Cost: [place] Medicaid and Private pay   Phone Number: 619.609.5090  Website: https://[employer]    Veterans Service Commission:  What they offer: Transportation for [facility] Veterans only.   Phone Number: 920.143.7956  Website: https://www.[employer]                  [place] Housing Resources*  (Call United Way/211 if need more resources.)        [facility]:  What they offer: Helps provide affordable, decent, safe housing.  Help further your education in buying a home and securing your financial future with Family Self-Sufficiency (FSS).  They have a home ownership program that helps Housing Choice voucher holders.  They have the Single Room Occupancy (SRO) which helps single homeless males apply to live in the [facility].  Shelter Plus care vouchers for individuals and small households in [facility], who have at least 1 disable person and are homeless. Project based vouchers for subsidized units by the public housing authority’s section 8 program.   Phone Number: 797.305.8903  Website: [facility]:  What they offer: Help for Domestic violence victims. Obtain emergency confidential shelter, individual and housing advocacy.    Phone Number: 219.276.5555  Website: https://[employer]    [facility]:  What they offer: Provide prevention services before families reach crisis, Homeless Shelter for families, stabilizing programs once they have secured housing to insure, they remain independent.   Phone Number:  803.592.6101  Website: https://familypromise.org/latest/affiliate/[facility]          [facility]:  What they

## 2024-10-30 NOTE — PROGRESS NOTES
[facility]  [address]  [place] [address]  Dept: [phone]  Dept Fax: [phone]  Loc: [phone]    Kelly Serrano is a 19 y.o. femalewho presents today for her medical conditions/complaints as noted below.  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[place], STUDYING BUSINESS ADMINSTRATION [employer]     No formal exercise program  Sleeping well at hs   Not sexually active     Anxiety/MDD  -pt experiencing anxiety, more than before  -picks at her nail beds,  Seeing a therapist  -having anxiety attacks , once a week  -feeling down and sad a few times a week  -able to get out of bed in the AM, looks forward to doing things she likes  Does spend a lot of time in bed  Restless at night  , hard to shut mind down at night   Taking paxil 20 mg but not working well       Lab Results   Component Value Date    WBC 5.5 03/07/2024    HGB 13.1 03/07/2024    HCT 42.0 03/07/2024    MCV 92.7 03/07/2024     03/07/2024      Lab Results   Component Value Date/Time     03/07/2024 06:15 AM    K 4.4 03/07/2024 06:15 AM     03/07/2024 06:15 AM    CO2 22 03/07/2024 06:15 AM    BUN 11 03/07/2024 06:15 AM    CREATININE 0.8 03/07/2024 06:15 AM    GLUCOSE 93 03/07/2024 06:15 AM    CALCIUM 9.6 03/07/2024 06:15 AM    LABGLOM >60 03/07/2024 06:15 AM       Lab Results   Component Value Date    TSH 1.200 03/07/2024      Current Outpatient Medications   Medication Sig Dispense Refill    FLUoxetine (PROZAC) 10 MG capsule Take 1 capsule by mouth daily 30 capsule 3     No current facility-administered medications for this visit.       History reviewed. No pertinent past medical history.   History reviewed. No pertinent surgical history.  History reviewed. No pertinent family history.  Social History     Tobacco Use    Smoking status: Never     Passive

## 2024-12-02 ENCOUNTER — OFFICE VISIT (OUTPATIENT)
Dept: FAMILY MEDICINE CLINIC | Age: 19
End: 2024-12-02
Payer: COMMERCIAL

## 2024-12-02 VITALS
TEMPERATURE: 98.2 F | OXYGEN SATURATION: 98 % | HEART RATE: 94 BPM | WEIGHT: 173.6 LBS | SYSTOLIC BLOOD PRESSURE: 118 MMHG | BODY MASS INDEX: 27.25 KG/M2 | RESPIRATION RATE: 16 BRPM | DIASTOLIC BLOOD PRESSURE: 76 MMHG | HEIGHT: 67 IN

## 2024-12-02 DIAGNOSIS — F41.1 GAD (GENERALIZED ANXIETY DISORDER): ICD-10-CM

## 2024-12-02 DIAGNOSIS — F33.0 MAJOR DEPRESSIVE DISORDER, RECURRENT, MILD (HCC): Primary | ICD-10-CM

## 2024-12-02 PROCEDURE — 99213 OFFICE O/P EST LOW 20 MIN: CPT | Performed by: NURSE PRACTITIONER

## 2024-12-02 NOTE — PROGRESS NOTES
[facility]  [address]  [place] [address]  Dept: [phone]  Dept Fax: [phone]  Loc: [phone]    Kelly Serrano is a 19 y.o. femalewho presents today for her medical conditions/complaints as noted below.  Kelly Serranois c/o of Anxiety (Follow up no concerns )      :     HPI      Ot here for 1 month f/u     Anxiety/MDD  -pt had been experiencing anxiety, more than before  -picks at her nail beds,  Seeing a therapist  -had been having anxiety attacks , once a week, less now  -feeling down and sad a few times a week  -able to get out of bed in the AM, looks forward to doing things she likes  -spending less time in bed   Had been Restless at night  , hard to shut mind down at night, but sleeping better now    Had been Taking paxil 20 mg but not working well   -changed over to prozac 1 month ago and it is working better for her  -anxiety is lessened , feeling more calm   Wants a dose increase    Did state she has a headache daily that will last for an hour or so goes away o it's own  No N/V or vision changes or facial N/T   Has not had eyes checked in the last year   Able to talk to customers easier       Lab Results   Component Value Date    WBC 5.5 03/07/2024    HGB 13.1 03/07/2024    HCT 42.0 03/07/2024    MCV 92.7 03/07/2024     03/07/2024      Lab Results   Component Value Date/Time     03/07/2024 06:15 AM    K 4.4 03/07/2024 06:15 AM     03/07/2024 06:15 AM    CO2 22 03/07/2024 06:15 AM    BUN 11 03/07/2024 06:15 AM    CREATININE 0.8 03/07/2024 06:15 AM    GLUCOSE 93 03/07/2024 06:15 AM    CALCIUM 9.6 03/07/2024 06:15 AM    LABGLOM >60 03/07/2024 06:15 AM       Lab Results   Component Value Date    TSH 1.200 03/07/2024        Current Outpatient Medications   Medication Sig Dispense Refill    FLUoxetine (PROZAC) 20 MG capsule Take 1 capsule by mouth daily 90 capsule 1     No current facility-administered medications

## 2025-01-03 ENCOUNTER — OFFICE VISIT (OUTPATIENT)
Dept: FAMILY MEDICINE CLINIC | Age: 20
End: 2025-01-03

## 2025-01-03 VITALS
DIASTOLIC BLOOD PRESSURE: 72 MMHG | HEIGHT: 67 IN | OXYGEN SATURATION: 97 % | WEIGHT: 175.8 LBS | BODY MASS INDEX: 27.59 KG/M2 | SYSTOLIC BLOOD PRESSURE: 118 MMHG | RESPIRATION RATE: 16 BRPM | HEART RATE: 63 BPM | TEMPERATURE: 98.3 F

## 2025-01-03 DIAGNOSIS — H61.22 IMPACTED CERUMEN OF LEFT EAR: ICD-10-CM

## 2025-01-03 DIAGNOSIS — J35.8 TONSILLAR EXUDATE: ICD-10-CM

## 2025-01-03 DIAGNOSIS — J02.9 PHARYNGITIS, UNSPECIFIED ETIOLOGY: Primary | ICD-10-CM

## 2025-01-03 LAB
HETEROPHILE ANTIBODIES: NEGATIVE
STREPTOCOCCUS A RNA: NEGATIVE

## 2025-01-03 RX ORDER — AMOXICILLIN 500 MG/1
500 CAPSULE ORAL 3 TIMES DAILY
Qty: 30 CAPSULE | Refills: 0 | Status: SHIPPED | OUTPATIENT
Start: 2025-01-03 | End: 2025-01-13

## 2025-01-03 ASSESSMENT — PATIENT HEALTH QUESTIONNAIRE - PHQ9
6. FEELING BAD ABOUT YOURSELF - OR THAT YOU ARE A FAILURE OR HAVE LET YOURSELF OR YOUR FAMILY DOWN: NOT AT ALL
SUM OF ALL RESPONSES TO PHQ QUESTIONS 1-9: 1
4. FEELING TIRED OR HAVING LITTLE ENERGY: SEVERAL DAYS
SUM OF ALL RESPONSES TO PHQ QUESTIONS 1-9: 1
10. IF YOU CHECKED OFF ANY PROBLEMS, HOW DIFFICULT HAVE THESE PROBLEMS MADE IT FOR YOU TO DO YOUR WORK, TAKE CARE OF THINGS AT HOME, OR GET ALONG WITH OTHER PEOPLE: NOT DIFFICULT AT ALL
8. MOVING OR SPEAKING SO SLOWLY THAT OTHER PEOPLE COULD HAVE NOTICED. OR THE OPPOSITE, BEING SO FIGETY OR RESTLESS THAT YOU HAVE BEEN MOVING AROUND A LOT MORE THAN USUAL: NOT AT ALL
3. TROUBLE FALLING OR STAYING ASLEEP: NOT AT ALL
SUM OF ALL RESPONSES TO PHQ9 QUESTIONS 1 & 2: 0
2. FEELING DOWN, DEPRESSED OR HOPELESS: NOT AT ALL
SUM OF ALL RESPONSES TO PHQ QUESTIONS 1-9: 1
5. POOR APPETITE OR OVEREATING: NOT AT ALL
9. THOUGHTS THAT YOU WOULD BE BETTER OFF DEAD, OR OF HURTING YOURSELF: NOT AT ALL
1. LITTLE INTEREST OR PLEASURE IN DOING THINGS: NOT AT ALL
7. TROUBLE CONCENTRATING ON THINGS, SUCH AS READING THE NEWSPAPER OR WATCHING TELEVISION: NOT AT ALL
SUM OF ALL RESPONSES TO PHQ QUESTIONS 1-9: 1

## 2025-01-03 NOTE — PROGRESS NOTES
SUBJECTIVE:  Kelly Serrano is a 19 y.o. y/o female that presents with Follow-up (Sore throat, red, white spots started 2 days ago )  .    HPI:      Symptoms have been present for 3 day(s).  Fever?  No  Odynophagia? Yes  Dysphagia?  Yes  Cough?  Yes  Rhinitis?  No  Hx of EBV? No  Sick Contacts? Yes    Pt denies SOB, wheezing, stridor, nausea or vomiting.      OBJECTIVE:  /72   Pulse 63   Temp 98.3 °F (36.8 °C)   Resp 16   Ht 1.702 m (5' 7\")   Wt 79.7 kg (175 lb 12.8 oz)   SpO2 97%   BMI 27.53 kg/m²   Physical Examination:   General appearance - alert, well appearing, and in no distress  Ears - bilateral TM's and external ear canals normal  Nose - normal and patent, no erythema, discharge or polyps  Mouth - erythematous, exudate noted, tonsils hypertrophied with exudate, tongue normal, and mono obtained   Cerumen impaction cleared after irrigation   Neck - bilateral symmetric anterior adenopathy  Chest - clear to auscultation, no wheezes, rales or rhonchi, symmetric air entry  Heart - normal rate, regular rhythm, normal S1, S2, no murmurs, rubs, clicks or gallops  Abdomen - soft, nontender, nondistended, no masses or organomegaly  Musculoskeletal - no joint tenderness, deformity or swelling  Skin exam - normal coloration and turgor, no rashes, no suspicious skin lesions noted.      Rapid Strep Performed today was .    ASSESSMENT & PLAN  Kelly was seen today for follow-up.    Diagnoses and all orders for this visit:    Pharyngitis, unspecified etiology  -     POCT Rapid Strep A DNA (Alere i) negative   -     POCT Infectious mononucleosis Abs (mono) negative  capsule by mouth 3 times daily for 10 days  -     Culture, Throat  Send out for culture  Mono negative will use amoxicillin for tx.   Start amoxicillin  Warm soft and cool foods  Call for changing symptoms  Exam not consistent with peritonsillar abscess  Motrin for fever or pain   Pt in agreement wit plan   Impacted cerumen of left ear  -

## 2025-01-05 LAB — BACTERIA THROAT AEROBE CULT: NORMAL

## 2025-01-06 ENCOUNTER — TELEPHONE (OUTPATIENT)
Dept: FAMILY MEDICINE CLINIC | Age: 20
End: 2025-01-06

## 2025-01-06 NOTE — TELEPHONE ENCOUNTER
----- Message from MISTY Vail CNP sent at 1/5/2025  9:45 PM EST -----  Let pt know her throat culture is negative, have pt symptoms improved?? With the antibiotics

## 2025-01-07 NOTE — TELEPHONE ENCOUNTER
Left message on answering machine. Requested pt to call back at [phone], at their earliest convenience.

## 2025-01-07 NOTE — TELEPHONE ENCOUNTER
Pt informed of throat culture results and voiced understanding.   Pt states that she no longer has any symptoms

## 2025-01-18 ENCOUNTER — HOSPITAL ENCOUNTER (EMERGENCY)
Age: 20
Discharge: HOME | End: 2025-01-18
Payer: COMMERCIAL

## 2025-01-18 VITALS
SYSTOLIC BLOOD PRESSURE: 119 MMHG | RESPIRATION RATE: 16 BRPM | DIASTOLIC BLOOD PRESSURE: 86 MMHG | OXYGEN SATURATION: 98 % | HEART RATE: 93 BPM | TEMPERATURE: 99 F | BODY MASS INDEX: 27.75 KG/M2 | WEIGHT: 177.2 LBS

## 2025-01-18 DIAGNOSIS — J02.9 ACUTE PHARYNGITIS, UNSPECIFIED ETIOLOGY: Primary | ICD-10-CM

## 2025-01-18 LAB
MONONUCLEOSIS ANTIBODY: NEGATIVE
S PYO AG THROAT QL: NEGATIVE

## 2025-01-18 PROCEDURE — 87651 STREP A DNA AMP PROBE: CPT

## 2025-01-18 PROCEDURE — 36415 COLL VENOUS BLD VENIPUNCTURE: CPT

## 2025-01-18 PROCEDURE — 99214 OFFICE O/P EST MOD 30 MIN: CPT

## 2025-01-18 PROCEDURE — 86308 HETEROPHILE ANTIBODY SCREEN: CPT

## 2025-01-18 PROCEDURE — 99213 OFFICE O/P EST LOW 20 MIN: CPT

## 2025-01-18 PROCEDURE — 87070 CULTURE OTHR SPECIMN AEROBIC: CPT

## 2025-01-18 RX ORDER — PREDNISONE 10 MG/1
TABLET ORAL
Qty: 20 TABLET | Refills: 0 | Status: SHIPPED | OUTPATIENT
Start: 2025-01-18 | End: 2025-01-28

## 2025-01-18 RX ORDER — CEFDINIR 300 MG/1
300 CAPSULE ORAL 2 TIMES DAILY
Qty: 20 CAPSULE | Refills: 0 | Status: SHIPPED | OUTPATIENT
Start: 2025-01-18 | End: 2025-01-28

## 2025-01-18 ASSESSMENT — PAIN DESCRIPTION - LOCATION: LOCATION: THROAT

## 2025-01-18 ASSESSMENT — PAIN - FUNCTIONAL ASSESSMENT
PAIN_FUNCTIONAL_ASSESSMENT: ACTIVITIES ARE NOT PREVENTED
PAIN_FUNCTIONAL_ASSESSMENT: 0-10

## 2025-01-18 ASSESSMENT — PAIN DESCRIPTION - FREQUENCY: FREQUENCY: CONTINUOUS

## 2025-01-18 ASSESSMENT — PAIN SCALES - GENERAL: PAINLEVEL_OUTOF10: 8

## 2025-01-18 NOTE — ED TRIAGE NOTES
Kelly arrives to room with complaint of  sore throat, swelling in tonsil area.  Just finished antibiotic Monday from PCP for same symptoms, felt better.  Symptoms returning the past 2 days.             States strep and mono testing came back negative.       Taking ibuprofen, last dose today at 8 am

## 2025-01-18 NOTE — DISCHARGE INSTRUCTIONS
Your Monospot test was negative.  This is unlikely to be mononucleosis.  Most sore throats are viral but considering you experience partial improvement with amoxicillin and unfortunately symptoms recurred antibiotics are reasonable tach.  Please take cefdinir as prescribed till gone even if you are feeling better.  You can take steroids in addition, this will help decrease inflammation associated with swollen/enlarged inflamed tonsils.    If you are having trouble swallowing, drooling, unable to tolerate fluids please go to ER.  If you are experiencing shortness of breath or audible breathing please go to ER.  If anything is out-of-control please go to ER.    If you fail to improve please follow-up with family doctor or return to urgent care.    I hope you are feeling better soon!

## 2025-01-18 NOTE — ED PROVIDER NOTES
[facility] URGENT CARE      URGENT CARE     Pt Name: Kelly Serrano  MRN: [id number]  Birthdate 2005  Date of evaluation: 1/18/2025  Provider: MISTY Mays CNP    Urgent Care Encounter     CHIEF COMPLAINT       Chief Complaint   Patient presents with    Pharyngitis     HISTORY OF PRESENT ILLNESS   Kelly Serrano is a 19 y.o. female who presents to urgent care chief complaint of sore throat.  Started a few weeks ago, evaluated by family doctor who tested for mono/strep and both test were negative.  Treated empirically with amoxicillin.  States she had somewhat improvement in her sore throat but unfortunately symptoms recurred again 2 days ago.  She denies potential for gonorrhea or high risk sexual encounters.  She denies fevers last week.  Denies nausea vomiting abdominal pain or diarrhea.  Unsure if she had sick contacts or similar symptoms.  Treating with OTC meds with intermittent improvement.  Explains she took amoxicillin as prescribed till gone.  Denies any alcohol use that may have rendered antibiotics less effective.Denies being told she has enlarged tonsils before, states she seen ENT for sinus surgery in the past and states they did not mention anything regarding any abnormal tonsils.  Denies ear pain    History obtained from patient    PAST MEDICAL HISTORY   History reviewed. No pertinent past medical history.  SURGICALHISTORY     Patient  has no past surgical history on file.  CURRENT MEDICATIONS       Discharge Medication List as of 1/18/2025  1:38 PM        CONTINUE these medications which have NOT CHANGED    Details   FLUoxetine (PROZAC) 20 MG capsule Take 1 capsule by mouth daily, Disp-90 capsule, R-1Normal           ALLERGIES     Patient is has No Known Allergies.  Patients   Immunization History   Administered Date(s) Administered    COVID-19, PFIZER PURPLE top, DILUTE for use, (age 12 y+), 30mcg/0.3mL 09/06/2021, 10/02/2021    DTaP vaccine 2003, 2003,

## 2025-01-19 LAB — BACTERIA THROAT AEROBE CULT: NORMAL

## 2025-01-20 LAB — BACTERIA THROAT AEROBE CULT: NORMAL

## 2025-03-02 SDOH — ECONOMIC STABILITY: INCOME INSECURITY: IN THE LAST 12 MONTHS, WAS THERE A TIME WHEN YOU WERE NOT ABLE TO PAY THE MORTGAGE OR RENT ON TIME?: YES

## 2025-03-02 SDOH — ECONOMIC STABILITY: FOOD INSECURITY: WITHIN THE PAST 12 MONTHS, THE FOOD YOU BOUGHT JUST DIDN'T LAST AND YOU DIDN'T HAVE MONEY TO GET MORE.: NEVER TRUE

## 2025-03-02 SDOH — ECONOMIC STABILITY: FOOD INSECURITY: WITHIN THE PAST 12 MONTHS, YOU WORRIED THAT YOUR FOOD WOULD RUN OUT BEFORE YOU GOT MONEY TO BUY MORE.: NEVER TRUE

## 2025-03-02 SDOH — ECONOMIC STABILITY: TRANSPORTATION INSECURITY
IN THE PAST 12 MONTHS, HAS THE LACK OF TRANSPORTATION KEPT YOU FROM MEDICAL APPOINTMENTS OR FROM GETTING MEDICATIONS?: YES

## 2025-03-03 ENCOUNTER — OFFICE VISIT (OUTPATIENT)
Dept: FAMILY MEDICINE CLINIC | Age: 20
End: 2025-03-03

## 2025-03-03 VITALS
OXYGEN SATURATION: 98 % | RESPIRATION RATE: 16 BRPM | HEART RATE: 92 BPM | TEMPERATURE: 98.5 F | DIASTOLIC BLOOD PRESSURE: 78 MMHG | SYSTOLIC BLOOD PRESSURE: 118 MMHG | HEIGHT: 67 IN | WEIGHT: 175.4 LBS | BODY MASS INDEX: 27.53 KG/M2

## 2025-03-03 DIAGNOSIS — F41.1 GAD (GENERALIZED ANXIETY DISORDER): ICD-10-CM

## 2025-03-03 DIAGNOSIS — F41.9 ANXIETY: ICD-10-CM

## 2025-03-03 DIAGNOSIS — F33.0 MAJOR DEPRESSIVE DISORDER, RECURRENT, MILD: Primary | ICD-10-CM

## 2025-03-03 RX ORDER — FLUOXETINE HYDROCHLORIDE 40 MG/1
40 CAPSULE ORAL DAILY
Qty: 90 CAPSULE | Refills: 1 | Status: SHIPPED | OUTPATIENT
Start: 2025-03-03

## 2025-03-03 ASSESSMENT — ENCOUNTER SYMPTOMS: RESPIRATORY NEGATIVE: 1

## 2025-03-03 NOTE — PROGRESS NOTES
BROCK and bubble study by Dr Smith.  Pt tolerated well with IV sedation of Versed 6 mg and Fentanyl 100 mcg.  Pt waking and sister in room with her.   medications for this visit.       History reviewed. No pertinent past medical history.   History reviewed. No pertinent surgical history.  History reviewed. No pertinent family history.  Social History     Tobacco Use    Smoking status: Never     Passive exposure: Never    Smokeless tobacco: Never   Substance Use Topics    Alcohol use: No        No Known Allergies    Health Maintenance   Topic Date Due    HIV screen  Never done    Chlamydia/GC screen  Never done    Hepatitis C screen  Never done    Pap smear  Never done    COVID-19 Vaccine (3 - 2024-25 season) 09/01/2024    Flu vaccine (1) 10/30/2025 (Originally 8/1/2024)    DTaP/Tdap/Td vaccine (7 - Td or Tdap) 08/11/2025    Depression Monitoring  01/03/2026    Hepatitis A vaccine  Completed    Hepatitis B vaccine  Completed    Hib vaccine  Completed    HPV vaccine  Completed    Polio vaccine  Completed    Varicella vaccine  Completed    Meningococcal (ACWY) vaccine  Completed    Pneumococcal 0-49 years Vaccine  Aged Out    Depression Screen  Discontinued     I have reviewed the patient's past medical history, past surgical history, allergies, medications, social and family history and I have made updates where appropriate.  Subjective:      Review of Systems   Constitutional:  Negative for chills, fatigue and fever.   Respiratory: Negative.     Cardiovascular: Negative.    Skin: Negative.    Psychiatric/Behavioral:  Positive for dysphoric mood and sleep disturbance. Negative for agitation, behavioral problems, confusion, decreased concentration, hallucinations, self-injury and suicidal ideas. The patient is nervous/anxious. The patient is not hyperactive.        Objective:     /78   Pulse 92   Temp 98.5 °F (36.9 °C)   Resp 16   Ht 1.702 m (5' 7\")   Wt 79.6 kg (175 lb 6.4 oz)   LMP 01/29/2025   SpO2 98%   BMI 27.47 kg/m²     Physical Exam  Vitals and nursing note reviewed.   Constitutional:       Appearance: She is not ill-appearing.   Cardiovascular:

## 2025-08-27 DIAGNOSIS — F41.1 GAD (GENERALIZED ANXIETY DISORDER): ICD-10-CM

## 2025-08-27 DIAGNOSIS — F33.0 MAJOR DEPRESSIVE DISORDER, RECURRENT, MILD: ICD-10-CM

## 2025-08-28 RX ORDER — FLUOXETINE HYDROCHLORIDE 40 MG/1
40 CAPSULE ORAL DAILY
Qty: 90 CAPSULE | Refills: 1 | Status: SHIPPED | OUTPATIENT
Start: 2025-08-28